# Patient Record
Sex: MALE | Race: OTHER | NOT HISPANIC OR LATINO | URBAN - METROPOLITAN AREA
[De-identification: names, ages, dates, MRNs, and addresses within clinical notes are randomized per-mention and may not be internally consistent; named-entity substitution may affect disease eponyms.]

---

## 2023-02-15 ENCOUNTER — EMERGENCY (EMERGENCY)
Facility: HOSPITAL | Age: 73
LOS: 1 days | Discharge: HOME | End: 2023-02-15
Attending: STUDENT IN AN ORGANIZED HEALTH CARE EDUCATION/TRAINING PROGRAM | Admitting: STUDENT IN AN ORGANIZED HEALTH CARE EDUCATION/TRAINING PROGRAM
Payer: MEDICARE

## 2023-02-15 VITALS
SYSTOLIC BLOOD PRESSURE: 165 MMHG | WEIGHT: 184.97 LBS | TEMPERATURE: 99 F | DIASTOLIC BLOOD PRESSURE: 95 MMHG | HEIGHT: 65 IN | RESPIRATION RATE: 18 BRPM | HEART RATE: 84 BPM | OXYGEN SATURATION: 94 %

## 2023-02-15 VITALS
TEMPERATURE: 99 F | HEART RATE: 75 BPM | OXYGEN SATURATION: 94 % | SYSTOLIC BLOOD PRESSURE: 121 MMHG | RESPIRATION RATE: 19 BRPM | DIASTOLIC BLOOD PRESSURE: 61 MMHG

## 2023-02-15 DIAGNOSIS — Z20.822 CONTACT WITH AND (SUSPECTED) EXPOSURE TO COVID-19: ICD-10-CM

## 2023-02-15 DIAGNOSIS — E78.5 HYPERLIPIDEMIA, UNSPECIFIED: ICD-10-CM

## 2023-02-15 DIAGNOSIS — D72.829 ELEVATED WHITE BLOOD CELL COUNT, UNSPECIFIED: ICD-10-CM

## 2023-02-15 DIAGNOSIS — Z86.011 PERSONAL HISTORY OF BENIGN NEOPLASM OF THE BRAIN: ICD-10-CM

## 2023-02-15 DIAGNOSIS — R55 SYNCOPE AND COLLAPSE: ICD-10-CM

## 2023-02-15 DIAGNOSIS — R79.89 OTHER SPECIFIED ABNORMAL FINDINGS OF BLOOD CHEMISTRY: ICD-10-CM
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
## 2023-02-15 DIAGNOSIS — E03.9 HYPOTHYROIDISM, UNSPECIFIED: ICD-10-CM

## 2023-02-15 DIAGNOSIS — Z95.0 PRESENCE OF CARDIAC PACEMAKER: ICD-10-CM

## 2023-02-15 DIAGNOSIS — E87.1 HYPO-OSMOLALITY AND HYPONATREMIA: ICD-10-CM

## 2023-02-15 DIAGNOSIS — K08.89 OTHER SPECIFIED DISORDERS OF TEETH AND SUPPORTING STRUCTURES: ICD-10-CM
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
## 2023-02-15 LAB
ALBUMIN SERPL ELPH-MCNC: 4.1 G/DL — SIGNIFICANT CHANGE UP (ref 3.3–5)
ALP SERPL-CCNC: 65 U/L — SIGNIFICANT CHANGE UP (ref 40–120)
ALT FLD-CCNC: 14 U/L — SIGNIFICANT CHANGE UP (ref 10–45)
ANION GAP SERPL CALC-SCNC: 9 MMOL/L — SIGNIFICANT CHANGE UP (ref 5–17)
APPEARANCE UR: CLEAR — SIGNIFICANT CHANGE UP
APTT BLD: 33.8 SEC — SIGNIFICANT CHANGE UP (ref 27.5–35.5)
AST SERPL-CCNC: 20 U/L — SIGNIFICANT CHANGE UP (ref 10–40)
BACTERIA # UR AUTO: PRESENT /HPF
BASE EXCESS BLDV CALC-SCNC: 2.9 MMOL/L — SIGNIFICANT CHANGE UP (ref -2–3)
BASOPHILS # BLD AUTO: 0 K/UL — SIGNIFICANT CHANGE UP (ref 0–0.2)
BASOPHILS NFR BLD AUTO: 0 % — SIGNIFICANT CHANGE UP (ref 0–2)
BILIRUB SERPL-MCNC: 0.6 MG/DL — SIGNIFICANT CHANGE UP (ref 0.2–1.2)
BILIRUB UR-MCNC: NEGATIVE — SIGNIFICANT CHANGE UP
BUN SERPL-MCNC: 18 MG/DL — SIGNIFICANT CHANGE UP (ref 7–23)
BURR CELLS BLD QL SMEAR: PRESENT — SIGNIFICANT CHANGE UP
CALCIUM SERPL-MCNC: 9.4 MG/DL — SIGNIFICANT CHANGE UP (ref 8.4–10.5)
CHLORIDE SERPL-SCNC: 94 MMOL/L — LOW (ref 96–108)
CO2 BLDV-SCNC: 29.2 MMOL/L — HIGH (ref 22–26)
CO2 SERPL-SCNC: 27 MMOL/L — SIGNIFICANT CHANGE UP (ref 22–31)
COLOR SPEC: YELLOW — SIGNIFICANT CHANGE UP
CREAT SERPL-MCNC: 1.12 MG/DL — SIGNIFICANT CHANGE UP (ref 0.5–1.3)
DIFF PNL FLD: ABNORMAL
EGFR: 69 ML/MIN/1.73M2 — SIGNIFICANT CHANGE UP
EOSINOPHIL # BLD AUTO: 0 K/UL — SIGNIFICANT CHANGE UP (ref 0–0.5)
EOSINOPHIL NFR BLD AUTO: 0 % — SIGNIFICANT CHANGE UP (ref 0–6)
EPI CELLS # UR: SIGNIFICANT CHANGE UP /HPF (ref 0–5)
FLUAV AG NPH QL: SIGNIFICANT CHANGE UP
FLUBV AG NPH QL: SIGNIFICANT CHANGE UP
GAS PNL BLDV: SIGNIFICANT CHANGE UP
GIANT PLATELETS BLD QL SMEAR: PRESENT — SIGNIFICANT CHANGE UP
GLUCOSE SERPL-MCNC: 158 MG/DL — HIGH (ref 70–99)
GLUCOSE UR QL: NEGATIVE — SIGNIFICANT CHANGE UP
HCO3 BLDV-SCNC: 28 MMOL/L — SIGNIFICANT CHANGE UP (ref 22–29)
HCT VFR BLD CALC: 41.1 % — SIGNIFICANT CHANGE UP (ref 39–50)
HGB BLD-MCNC: 13.8 G/DL — SIGNIFICANT CHANGE UP (ref 13–17)
INR BLD: 1.12 — SIGNIFICANT CHANGE UP (ref 0.88–1.16)
KETONES UR-MCNC: 15 MG/DL
LACTATE SERPL-SCNC: 1.2 MMOL/L — SIGNIFICANT CHANGE UP (ref 0.5–2)
LEUKOCYTE ESTERASE UR-ACNC: NEGATIVE — SIGNIFICANT CHANGE UP
LYMPHOCYTES # BLD AUTO: 0.53 K/UL — LOW (ref 1–3.3)
LYMPHOCYTES # BLD AUTO: 3.5 % — LOW (ref 13–44)
MANUAL SMEAR VERIFICATION: SIGNIFICANT CHANGE UP
MCHC RBC-ENTMCNC: 29.9 PG — SIGNIFICANT CHANGE UP (ref 27–34)
MCHC RBC-ENTMCNC: 33.6 GM/DL — SIGNIFICANT CHANGE UP (ref 32–36)
MCV RBC AUTO: 89.2 FL — SIGNIFICANT CHANGE UP (ref 80–100)
MONOCYTES # BLD AUTO: 1.33 K/UL — HIGH (ref 0–0.9)
MONOCYTES NFR BLD AUTO: 8.8 % — SIGNIFICANT CHANGE UP (ref 2–14)
NEUTROPHILS # BLD AUTO: 13.3 K/UL — HIGH (ref 1.8–7.4)
NEUTROPHILS NFR BLD AUTO: 87.7 % — HIGH (ref 43–77)
NITRITE UR-MCNC: NEGATIVE — SIGNIFICANT CHANGE UP
OVALOCYTES BLD QL SMEAR: SLIGHT — SIGNIFICANT CHANGE UP
PCO2 BLDV: 43 MMHG — SIGNIFICANT CHANGE UP (ref 42–55)
PH BLDV: 7.42 — SIGNIFICANT CHANGE UP (ref 7.32–7.43)
PH UR: 6 — SIGNIFICANT CHANGE UP (ref 5–8)
PLAT MORPH BLD: ABNORMAL
PLATELET # BLD AUTO: 174 K/UL — SIGNIFICANT CHANGE UP (ref 150–400)
PO2 BLDV: <33 MMHG — SIGNIFICANT CHANGE UP (ref 25–45)
POIKILOCYTOSIS BLD QL AUTO: SLIGHT — SIGNIFICANT CHANGE UP
POTASSIUM SERPL-MCNC: 4.3 MMOL/L — SIGNIFICANT CHANGE UP (ref 3.5–5.3)
POTASSIUM SERPL-SCNC: 4.3 MMOL/L — SIGNIFICANT CHANGE UP (ref 3.5–5.3)
PROT SERPL-MCNC: 7.1 G/DL — SIGNIFICANT CHANGE UP (ref 6–8.3)
PROT UR-MCNC: 30 MG/DL
PROTHROM AB SERPL-ACNC: 13.4 SEC — SIGNIFICANT CHANGE UP (ref 10.5–13.4)
RBC # BLD: 4.61 M/UL — SIGNIFICANT CHANGE UP (ref 4.2–5.8)
RBC # FLD: 13.1 % — SIGNIFICANT CHANGE UP (ref 10.3–14.5)
RBC BLD AUTO: ABNORMAL
RBC CASTS # UR COMP ASSIST: < 5 /HPF — SIGNIFICANT CHANGE UP
RSV RNA NPH QL NAA+NON-PROBE: SIGNIFICANT CHANGE UP
SAO2 % BLDV: 40 % — LOW (ref 67–88)
SARS-COV-2 RNA SPEC QL NAA+PROBE: SIGNIFICANT CHANGE UP
SODIUM SERPL-SCNC: 130 MMOL/L — LOW (ref 135–145)
SP GR SPEC: 1.02 — SIGNIFICANT CHANGE UP (ref 1–1.03)
SPHEROCYTES BLD QL SMEAR: SLIGHT — SIGNIFICANT CHANGE UP
TROPONIN T SERPL-MCNC: 0.01 NG/ML — SIGNIFICANT CHANGE UP (ref 0–0.01)
UROBILINOGEN FLD QL: 0.2 E.U./DL — SIGNIFICANT CHANGE UP
WBC # BLD: 15.16 K/UL — HIGH (ref 3.8–10.5)
WBC # FLD AUTO: 15.16 K/UL — HIGH (ref 3.8–10.5)
WBC UR QL: < 5 /HPF — SIGNIFICANT CHANGE UP

## 2023-02-15 PROCEDURE — 85379 FIBRIN DEGRADATION QUANT: CPT

## 2023-02-15 PROCEDURE — 99285 EMERGENCY DEPT VISIT HI MDM: CPT | Mod: FS

## 2023-02-15 PROCEDURE — 71045 X-RAY EXAM CHEST 1 VIEW: CPT

## 2023-02-15 PROCEDURE — 83880 ASSAY OF NATRIURETIC PEPTIDE: CPT

## 2023-02-15 PROCEDURE — 85730 THROMBOPLASTIN TIME PARTIAL: CPT

## 2023-02-15 PROCEDURE — 85610 PROTHROMBIN TIME: CPT

## 2023-02-15 PROCEDURE — 93005 ELECTROCARDIOGRAM TRACING: CPT

## 2023-02-15 PROCEDURE — 36415 COLL VENOUS BLD VENIPUNCTURE: CPT

## 2023-02-15 PROCEDURE — 85025 COMPLETE CBC W/AUTO DIFF WBC: CPT

## 2023-02-15 PROCEDURE — 80053 COMPREHEN METABOLIC PANEL: CPT

## 2023-02-15 PROCEDURE — 99285 EMERGENCY DEPT VISIT HI MDM: CPT | Mod: 25

## 2023-02-15 PROCEDURE — 87637 SARSCOV2&INF A&B&RSV AMP PRB: CPT

## 2023-02-15 PROCEDURE — 82803 BLOOD GASES ANY COMBINATION: CPT

## 2023-02-15 PROCEDURE — 84484 ASSAY OF TROPONIN QUANT: CPT

## 2023-02-15 PROCEDURE — 87040 BLOOD CULTURE FOR BACTERIA: CPT

## 2023-02-15 PROCEDURE — 83605 ASSAY OF LACTIC ACID: CPT

## 2023-02-15 PROCEDURE — 71045 X-RAY EXAM CHEST 1 VIEW: CPT | Mod: 26

## 2023-02-15 PROCEDURE — 81001 URINALYSIS AUTO W/SCOPE: CPT

## 2023-02-15 RX ORDER — ACETAMINOPHEN 500 MG
975 TABLET ORAL ONCE
Refills: 0 | Status: COMPLETED | OUTPATIENT
Start: 2023-02-15 | End: 2023-02-15

## 2023-02-15 RX ADMIN — Medication 975 MILLIGRAM(S): at 01:45

## 2023-02-15 NOTE — ED PROVIDER NOTE - PROGRESS NOTE DETAILS
ECG nonischemic, normal intervals  wbc count elevated to 15. slightly hyponatremic to 130.   Labs otherwise unremarkable w trop / dimer negative. bnp age appropriate.   Pacemaker interrogated by cardiology, without events.   CXR w ?possible R sided infiltrate.  pt already started on abx from his pmd today.     otherwise - pt has been asymptomatic through entirety of ED visit. ECG nonischemic, normal intervals  wbc count elevated to 15. slightly hyponatremic to 130.   Labs otherwise unremarkable w trop / dimer negative. bnp age appropriate.   Pacemaker interrogated by cardiology, without events.   CXR w ?possible R sided infiltrate.  pt already started on abx from his pmd today.     otherwise - pt has been asymptomatic through entirety of ED visit.    All results reviewed with the patient verbally. Discharge plan and return precautions d/w pt who verbalized understanding and agrees with plan. All questions answered. Vitals WNL. Ready for d/c.

## 2023-02-15 NOTE — ED PROVIDER NOTE - NSFOLLOWUPINSTRUCTIONS_ED_ALL_ED_FT
You were seen in the emergency department after a syncopal episode.   Your work-up (labs EKG chest x-ray) was reassuring to rule out an acute medical emergency.  Your pacemaker was interrogated by the cardiology team and there were no issues.    Take antibiotic that was prescribed this morning as instructed.  Drink plenty of fluids, get plenty of rest.  Avoid strenuous activity until you are seen in follow-up.  Continue all medications as previously prescribed.    Follow-up with your primary care doctor within 1 week for reevaluation.    Return to this emergency department if you pass out or lose consciousness again / repeatedly, feel palpitations / chest pain / shortness of breath, have nausea vomiting diarrhea, any signs of bleeding anywhere (spontaneous bleeding, bloody/black stools), or if you have any other concerns.

## 2023-02-15 NOTE — ED PROVIDER NOTE - NS ED ATTENDING STATEMENT MOD
This was a shared visit with the CATARINO. I reviewed and verified the documentation and independently performed the documented:

## 2023-02-15 NOTE — ED PROVIDER NOTE - PATIENT PORTAL LINK FT
You can access the FollowMyHealth Patient Portal offered by [facility] by registering at the following website: [email or web address]. By joining [employer]’s FollowMyHealth portal, you will also be able to view your health information using other applications (apps) compatible with our system.

## 2023-02-15 NOTE — ED ADULT NURSE NOTE - OBJECTIVE STATEMENT
73 y/o M presents to the ED via PCP referral for elevated WBC, low sodium, and dental pain since today. PIV placed. Labs showing WBC 15.16, Na 130. Tylenol given for dental pain. VSS. Poor dentition.

## 2023-02-15 NOTE — ED PROVIDER NOTE - CLINICAL SUMMARY MEDICAL DECISION MAKING FREE TEXT BOX
history of hypothyroid, HLD, s/p pacemaker, s/p pituitary surgery, history of hypothyroid, HLD, s/p pacemaker, s/p pituitary surgery,. here after syncopal episode this morning. outpt labs this afternoon from pmd showing elevated wbc and ?high blood test. sent to r/o blood clot per wife.   pt nontoxic appearing, borderline febrile 99.3, O2 sat 94%, vitals otherwise wnl. exam unremarkable. lungs clear.   syncope, abnormal labs  w elevated wbc possible syncope due to infection / viral illness, however no focal infectious symptoms.  w pacemaker will have cards interrogate to r/o arrythmia as cause of syncope  ?elevated blood test out pt and sent for r/o blood clot. possibly elevated ddimer, no copy of results. will resend ddimer here  otherwise r/o anemia, electrolyte derangement, dehydration.   plan - labs, ecg, cxr, pacemaker interrogation  reassess

## 2023-02-15 NOTE — ED PROVIDER NOTE - ATTENDING APP SHARED VISIT CONTRIBUTION OF CARE
72M pituitary tumor s/p resection, ?diabetes insipidus, hypothyroidism, s/p ppm, c/o witnessed syncope. avss. nontoxic. NAD. no systemic sx. no trauma. no red flags. nonspecific leukocytosis. remaining labs wnl. ekg nonischemic. mild ?acute vs chronic bl interstitial opacities on cxr no prior comparison w/o acute focal consolidation vs ptx vs osseous fx/dislocation. cards consulted for ppm interrogation. s/o'd overnight team stable pending bnp results, flu/rsv/covid results, ppm interrogation by cards -- anticipate dc home if no acute abnl, already on abx for tooth infection.    I discussed the care of the pt directly with the ACP while the pt was in the ED. i have reviewed the ACP note and agree w/ the history, exam and plan of care other than as noted above.

## 2023-02-15 NOTE — ED PROVIDER NOTE - OBJECTIVE STATEMENT
72 yr old male, history of hypothyroid, HLD, s/p pacemaker, s/p pituitary surgery, presents to the Emergency Department for abnormal labs. History from patient and wife at bedside.  Patient had syncopal episode while sitting at the kitchen table this morning.  Patient unclear on details possibly felt lightheaded prior to episode.  Slumped over, and fell out of the chair onto his right side.  Witnessed by wife.  No head strike. Pt denies injuries.  EMS came to the house patient declined transport to hospital.  saw PMD this afternoon who hi blood work. also prescribed him an antibiotic for a dental infection, as patient has had a few days of right-sided tooth pain. Was called [facility] and told that his blood work showed an elevated white blood cell count, and another blood test was high.  Unclear what this level was.  Was told to come to the emergency department to rule out a blood clot.  On arrival patient only complains of dental pain.  Has felt well the remainder of the day since the syncopal episode.  no fevers, sore throat, cough, chest pain, SOB, abd pain, n/v/d, urinary symptoms, leg swelling, headache, dizziness, extremity weakness / numbness / tingling.   meds - cortisone, -statin, desmopressin, ?thyroid medication 72 yr old male, history of hypothyroid, HLD, s/p pacemaker, s/p pituitary surgery, presents to the Emergency Department for abnormal labs. History from patient and wife at bedside.  Patient had syncopal episode while sitting at the kitchen table this morning.  Patient unclear on details possibly felt lightheaded prior to episode.  Slumped over, and fell out of the chair onto his right side.  Witnessed by wife.  No head strike. Pt denies injuries.  EMS came to the house patient declined transport to hospital.  saw PMD this afternoon who hi blood work. also prescribed him an antibiotic for a dental infection, as patient has had a few days of right-sided tooth pain. Was called [facility] and told that his blood work showed an elevated white blood cell count, and another blood test was high.  Unclear what this level was.  Was told to come to the emergency department to rule out a blood clot.  On arrival patient only complains of dental pain.  Has felt well the remainder of the day since the syncopal episode.  no fevers, sore throat, cough, chest pain, SOB, abd pain, n/v/d, urinary symptoms, leg swelling, headache, dizziness, extremity weakness / numbness / tingling.   meds : cortisone, -statin, desmopressin, ?thyroid medication

## 2023-02-15 NOTE — ED PROVIDER NOTE - SHIFT CHANGE DETAILS
72M pituitary tumor s/p resection, ?diabetes insipidus, hypothyroidism, s/p ppm, c/o witnessed syncope. avss. nontoxic. NAD. no systemic sx. no trauma. no red flags. nonspecific leukocytosis. remaining labs wnl. ekg nonischemic. mild bl interstitial opacities on cxr w/o acute focal consolidation vs ptx vs osseous fx/dislocation. cards consulted for ppm interrogation.     dispo: pending bnp results, flu/rsv/covid results, ppm interrogation by cards -- anticipate dc home if no acute abnl, already on abx for tooth infection 72M pituitary tumor s/p resection, ?diabetes insipidus, hypothyroidism, s/p ppm, c/o witnessed syncope. avss. nontoxic. NAD. no systemic sx. no trauma. no red flags. nonspecific leukocytosis. remaining labs wnl. ekg nonischemic.mild ?acute vs chronic bl interstitial opacities on cxr no prior comparison w/o acute focal consolidation vs ptx vs osseous fx/dislocation. cards consulted for ppm interrogation.     dispo: pending bnp results, flu/rsv/covid results, ppm interrogation by cards -- anticipate dc home if no acute abnl, already on abx for tooth infection

## 2023-02-20 LAB
CULTURE RESULTS: SIGNIFICANT CHANGE UP
CULTURE RESULTS: SIGNIFICANT CHANGE UP
SPECIMEN SOURCE: SIGNIFICANT CHANGE UP
SPECIMEN SOURCE: SIGNIFICANT CHANGE UP